# Patient Record
Sex: MALE | Race: WHITE
[De-identification: names, ages, dates, MRNs, and addresses within clinical notes are randomized per-mention and may not be internally consistent; named-entity substitution may affect disease eponyms.]

---

## 2017-02-27 ENCOUNTER — PRIOR ORIGINAL RECORDS (OUTPATIENT)
Dept: OTHER | Age: 22
End: 2017-02-27

## 2018-04-09 ENCOUNTER — MYAURORA ACCOUNT LINK (OUTPATIENT)
Dept: OTHER | Age: 23
End: 2018-04-09

## 2018-04-09 ENCOUNTER — PRIOR ORIGINAL RECORDS (OUTPATIENT)
Dept: OTHER | Age: 23
End: 2018-04-09

## 2018-11-07 ENCOUNTER — PRIOR ORIGINAL RECORDS (OUTPATIENT)
Dept: OTHER | Age: 23
End: 2018-11-07

## 2018-11-29 ENCOUNTER — OFFICE VISIT (OUTPATIENT)
Dept: SLEEP CENTER | Facility: HOSPITAL | Age: 23
End: 2018-11-29
Attending: INTERNAL MEDICINE
Payer: COMMERCIAL

## 2018-11-29 DIAGNOSIS — R06.83 SNORING: ICD-10-CM

## 2018-11-29 DIAGNOSIS — R06.81 APNEA: ICD-10-CM

## 2018-11-29 PROCEDURE — 95811 POLYSOM 6/>YRS CPAP 4/> PARM: CPT

## 2018-12-05 NOTE — PROCEDURES
[address] [address]       Accredited by the [facility] ([facility])    PATIENT'S NAME:        Heriberto Nunez  ATTENDING PHYSICIAN:   Bill Olson M.D.   REFERRING PHYSICIAN:   Merlin Pollack, M.D. flow.      POLYSOMNOGRAPHIC FINDINGS:  During the baseline portion of the study, the total recording time was 130 minutes, the total sleep time 117 minutes, for a sleep efficiency of 89%. Sleep onset latency was 1 minute.   Wake after sleep onset time was oxyhemoglobin desaturation. Patient was successfully titrated to a CPAP of 16 cm of water. RECOMMENDATIONS:  CPAP at 16 cm of water using an AirTouch F20 size medium mask with humidity level of 4 and an EPR level of 3.   Close clinical followup is neede

## 2018-12-17 ENCOUNTER — PRIOR ORIGINAL RECORDS (OUTPATIENT)
Dept: OTHER | Age: 23
End: 2018-12-17

## 2018-12-28 ENCOUNTER — PRIOR ORIGINAL RECORDS (OUTPATIENT)
Dept: OTHER | Age: 23
End: 2018-12-28

## 2019-02-28 VITALS
HEART RATE: 85 BPM | WEIGHT: 282 LBS | DIASTOLIC BLOOD PRESSURE: 78 MMHG | HEIGHT: 71 IN | BODY MASS INDEX: 39.48 KG/M2 | SYSTOLIC BLOOD PRESSURE: 114 MMHG

## 2019-03-01 VITALS
SYSTOLIC BLOOD PRESSURE: 108 MMHG | HEIGHT: 71 IN | WEIGHT: 265 LBS | HEART RATE: 71 BPM | DIASTOLIC BLOOD PRESSURE: 80 MMHG | BODY MASS INDEX: 37.1 KG/M2

## 2019-03-15 RX ORDER — OMEPRAZOLE 20 MG/1
1 TABLET, DELAYED RELEASE ORAL DAILY
COMMUNITY
Start: 2016-01-11 | End: 2019-03-15 | Stop reason: CLARIF

## 2019-03-15 RX ORDER — RISPERIDONE 2 MG/1
TABLET ORAL
COMMUNITY

## 2019-09-16 ENCOUNTER — HOSPITAL ENCOUNTER (OUTPATIENT)
Age: 24
Discharge: HOME | End: 2019-09-16
Attending: EMERGENCY MEDICINE

## 2019-09-16 VITALS
OXYGEN SATURATION: 96 % | RESPIRATION RATE: 22 BRPM | TEMPERATURE: 98 F | SYSTOLIC BLOOD PRESSURE: 134 MMHG | DIASTOLIC BLOOD PRESSURE: 84 MMHG | HEART RATE: 114 BPM

## 2019-09-16 DIAGNOSIS — J02.8 ACUTE BACTERIAL PHARYNGITIS: Primary | ICD-10-CM

## 2019-09-16 DIAGNOSIS — B96.89 ACUTE BACTERIAL PHARYNGITIS: Primary | ICD-10-CM

## 2019-09-16 LAB — S PYO AG THROAT QL: NEGATIVE

## 2019-09-16 PROCEDURE — 87081 CULTURE SCREEN ONLY: CPT | Performed by: EMERGENCY MEDICINE

## 2019-09-16 PROCEDURE — 99204 OFFICE O/P NEW MOD 45 MIN: CPT

## 2019-09-16 PROCEDURE — 87430 STREP A AG IA: CPT

## 2019-09-16 RX ORDER — AMOXICILLIN 875 MG/1
875 TABLET, COATED ORAL 2 TIMES DAILY
Qty: 20 TABLET | Refills: 0 | Status: SHIPPED | OUTPATIENT
Start: 2019-09-16 | End: 2019-09-26

## 2019-09-16 RX ORDER — AMOXICILLIN 875 MG/1
875 TABLET, COATED ORAL 2 TIMES DAILY
Qty: 20 TABLET | Refills: 0 | Status: SHIPPED | OUTPATIENT
Start: 2019-09-16 | End: 2019-09-16

## 2019-09-16 RX ORDER — DIVALPROEX SODIUM 250 MG/1
250 TABLET, DELAYED RELEASE ORAL 3 TIMES DAILY
COMMUNITY

## 2019-09-16 NOTE — ED INITIAL ASSESSMENT (HPI)
Pt c/o sore throat, productive cough, headache with coughing, nasal congestion since yesterday. States lives in a group home, positive strep exposure. Caregiver here with pt. No fever. Positive cough noted.    Positive nasal congestion noted,

## 2019-09-16 NOTE — ED PROVIDER NOTES
Patient Seen in: [facility] [address] [place] [address]    History   Patient presents with:  Sore Throat  Cough/URI    Stated Complaint: sorethroat/cold sx    HPI    The patient is a 61-year-old male with past history of bipolar disorder, depressio bilateral tonsillar hypertrophy. The uvula is midline  Chest: Clear to auscultation, no tenderness  Cardiovascular: Regular rate and rhythm without murmur  Abdomen: Soft, nontender and nondistended  Neurologic: Patient is awake, alert and oriented ×3.   Floreen Babinski

## 2024-02-08 ENCOUNTER — HOSPITAL ENCOUNTER (OUTPATIENT)
Age: 29
Discharge: HOME | End: 2024-02-08
Payer: MEDICARE

## 2024-02-08 VITALS
TEMPERATURE: 98 F | RESPIRATION RATE: 18 BRPM | SYSTOLIC BLOOD PRESSURE: 145 MMHG | OXYGEN SATURATION: 96 % | DIASTOLIC BLOOD PRESSURE: 113 MMHG | HEART RATE: 98 BPM

## 2024-02-08 DIAGNOSIS — L02.11 NECK ABSCESS: Primary | ICD-10-CM

## 2024-02-08 PROCEDURE — 99203 OFFICE O/P NEW LOW 30 MIN: CPT | Performed by: NURSE PRACTITIONER

## 2024-02-08 RX ORDER — OMEPRAZOLE 20 MG/1
1 CAPSULE, DELAYED RELEASE ORAL
COMMUNITY
Start: 2023-12-18

## 2024-02-08 RX ORDER — SULFAMETHOXAZOLE AND TRIMETHOPRIM 800; 160 MG/1; MG/1
1 TABLET ORAL 2 TIMES DAILY
Qty: 10 TABLET | Refills: 0 | Status: SHIPPED | OUTPATIENT
Start: 2024-02-08 | End: 2024-02-13

## 2024-02-08 NOTE — DISCHARGE INSTRUCTIONS
Bactrim 1 tablet twice per day for 5 days with food  Please take antibiotic as prescribed, finish full course  Wash the area with warm water and soap, apply bacitracin ointment and clean bandage twice per day  For fever, worsening pain, swelling or drainage, or any new/worsening symptoms, please go to the emergency room  Follow up with your primary care provider in 2 days for a re-check    Your blood pressure is high today. Please check your blood pressure at home 3 times per day, and write it down. Do this for 5 days. Follow up with your doctor in 5 days, and bring the blood pressure log with you for review. For now, avoid high sodium foods. Avoid decongestants like Afrin nasal spray or Sudafed. Blood pressure over 180/100 is concerning, if you have 2 consecutive readings like this, please call your primary care provider or go directly to the emergency room. If you develop chest pain, shortness of breath, dizziness, headache or other unusual symptoms, please go to the emergency room.

## 2024-02-08 NOTE — ED PROVIDER NOTES
Chief Complaint   Patient presents with    Skin Problem       HPI:     Patrick Ramon is a 28 year old male  with bipolar disorder and depression who presents for evaluation and management of a chief complaint of a probably cutaneous abscess.  Lesion is located in the neck. Onset 3 days however noticed a cyst in the area maybe 1 or 2 months ago.  He has had no fever.  No spontaneous drainage.  No history of MRSA.    PFSH  PFSH asessment screens reviewed and agree.  Nursing note reviewed and I agree with documentation.    Family History   Problem Relation Age of Onset    Cancer Paternal Grandfather      Family history reviewed with patient/caregiver and is not pertinent to presenting problem.  Social History     Socioeconomic History    Marital status: Single     Spouse name: Not on file    Number of children: Not on file    Years of education: Not on file    Highest education level: Not on file   Occupational History    Not on file   Tobacco Use    Smoking status: Every Day     Packs/day: 1.00     Years: 5.00     Additional pack years: 0.00     Total pack years: 5.00     Types: Cigarettes    Smokeless tobacco: Never   Substance and Sexual Activity    Alcohol use: No    Drug use: No    Sexual activity: Not on file   Other Topics Concern    Not on file   Social History Narrative    Not on file     Social Determinants of Health     Financial Resource Strain: Not on file   Food Insecurity: Not on file   Transportation Needs: Not on file   Physical Activity: Not on file   Stress: Not on file   Social Connections: Not on file   Housing Stability: Not on file       ROS:   Positive for stated complaint: abscess  All other systems reviewed and negative except as noted above.  Constitutional and Vital Signs Reviewed.      Physical Exam:     GENERAL: well developed, well nourished, well hydrated, in no apparent distress  EYES: sclera clear and non icteric bilaterally  HEENT: atraumatic, normocephalic, ears and throat are  clear  NECK: supple, no adenopathy  LUNGS: clear to auscultation, no RRW  CARDIO: RRR without murmur  EXTREMITIES: CMS intact. ARMENTA without difficulty    SKIN: There is an area characterized by a subcutaneous mass consistent with a cutaneous abscess measuring 3 cm in greatest dimension. Location: left posterior neck. Lymph nodes: none palpable.      MDM/Assessment/Plan:     Procedure Note:  Risks and benefits of procedure discussed.   Area prepped and draped.  Incision to abscess site made with 18G needle.   3 cc yellow pus drained.  Wound irrigated copiously.  Dressing applied.  Pt tolerated procedure well.    Orders for this encounter:    Orders Placed This Encounter    sulfamethoxazole-trimethoprim -160 MG Oral Tab per tablet     Sig: Take 1 tablet by mouth 2 (two) times daily for 5 days.     Dispense:  10 tablet     Refill:  0       Labs performed this visit:  No results found for this or any previous visit (from the past 10 hour(s)).    MDM:  Medical Decision Making  HPI and exam consistent with abscess.  No systemic symptoms.  No proximal red streaking.  Area was I&D with 18-gauge.  Patient was started on Bactrim.  Discussed wound care.  Discussed return precautions.  Advised close follow-up with primary care provider in 2 days for recheck.  Discussed elevated blood pressure with patient today.  Advised to keep a log of blood pressures 3 times per day for the next 5 days, and bring to primary care provider.  For blood pressure 180/100 or higher, patient is to go to the emergency room, especially if it is associated with headache, chest pain, shortness of breath, dizziness, or lightheadedness.  Discussed the implications of untreated elevated blood pressure.  Patient verbalized understanding agreeable to plan of care.    Risk  Prescription drug management.          Diagnosis:    ICD-10-CM    1. Neck abscess  L02.11           All results reviewed and discussed with patient.  See AVS for detailed discharge  instructions for your condition today.    Follow Up with:  No follow-up provider specified.

## 2025-06-18 ENCOUNTER — HOSPITAL ENCOUNTER (OUTPATIENT)
Dept: ULTRASOUND IMAGING | Facility: HOSPITAL | Age: 30
Discharge: HOME | End: 2025-06-18
Attending: NURSE PRACTITIONER
Payer: MEDICARE

## 2025-06-18 DIAGNOSIS — R74.8 ELEVATED LIVER ENZYMES: ICD-10-CM

## 2025-06-18 PROCEDURE — 76705 ECHO EXAM OF ABDOMEN: CPT | Performed by: NURSE PRACTITIONER
